# Patient Record
Sex: MALE | Race: WHITE | NOT HISPANIC OR LATINO | Employment: OTHER | URBAN - METROPOLITAN AREA
[De-identification: names, ages, dates, MRNs, and addresses within clinical notes are randomized per-mention and may not be internally consistent; named-entity substitution may affect disease eponyms.]

---

## 2020-01-13 ENCOUNTER — OFFICE VISIT (OUTPATIENT)
Dept: PULMONOLOGY | Facility: CLINIC | Age: 60
End: 2020-01-13

## 2020-01-13 VITALS
SYSTOLIC BLOOD PRESSURE: 142 MMHG | OXYGEN SATURATION: 96 % | TEMPERATURE: 97.7 F | WEIGHT: 244 LBS | BODY MASS INDEX: 36.14 KG/M2 | HEART RATE: 69 BPM | HEIGHT: 69 IN | DIASTOLIC BLOOD PRESSURE: 70 MMHG

## 2020-01-13 DIAGNOSIS — Z72.0 TOBACCO ABUSE: ICD-10-CM

## 2020-01-13 DIAGNOSIS — J44.9 CHRONIC OBSTRUCTIVE PULMONARY DISEASE, UNSPECIFIED COPD TYPE (HCC): ICD-10-CM

## 2020-01-13 DIAGNOSIS — J44.9 CHRONIC OBSTRUCTIVE PULMONARY DISEASE, UNSPECIFIED COPD TYPE (HCC): Primary | ICD-10-CM

## 2020-01-13 PROCEDURE — 94729 DIFFUSING CAPACITY: CPT | Performed by: INTERNAL MEDICINE

## 2020-01-13 PROCEDURE — 94726 PLETHYSMOGRAPHY LUNG VOLUMES: CPT | Performed by: INTERNAL MEDICINE

## 2020-01-13 PROCEDURE — 99204 OFFICE O/P NEW MOD 45 MIN: CPT | Performed by: INTERNAL MEDICINE

## 2020-01-13 PROCEDURE — 99407 BEHAV CHNG SMOKING > 10 MIN: CPT | Performed by: INTERNAL MEDICINE

## 2020-01-13 PROCEDURE — 94375 RESPIRATORY FLOW VOLUME LOOP: CPT | Performed by: INTERNAL MEDICINE

## 2020-01-13 RX ORDER — POTASSIUM CHLORIDE 20 MEQ/1
20 TABLET, EXTENDED RELEASE ORAL DAILY
COMMUNITY
Start: 2019-12-27

## 2020-01-13 RX ORDER — ALBUTEROL SULFATE 90 UG/1
2 AEROSOL, METERED RESPIRATORY (INHALATION) EVERY 4 HOURS PRN
Qty: 6.7 G | Refills: 11 | Status: SHIPPED | OUTPATIENT
Start: 2020-01-13

## 2020-01-13 RX ORDER — SIMVASTATIN 10 MG
10 TABLET ORAL NIGHTLY
COMMUNITY
End: 2020-01-14 | Stop reason: ALTCHOICE

## 2020-01-13 RX ORDER — TAMSULOSIN HYDROCHLORIDE 0.4 MG/1
2 CAPSULE ORAL NIGHTLY
COMMUNITY
Start: 2019-12-27

## 2020-01-13 RX ORDER — CHOLECALCIFEROL (VITAMIN D3) 25 MCG
CAPSULE ORAL
COMMUNITY
Start: 2019-10-31 | End: 2020-01-14 | Stop reason: SDUPTHER

## 2020-01-13 RX ORDER — CHLORTHALIDONE 25 MG/1
25 TABLET ORAL DAILY
COMMUNITY
Start: 2019-12-27

## 2020-01-13 RX ORDER — CHOLECALCIFEROL (VITAMIN D3) 50 MCG
2000 TABLET ORAL DAILY
COMMUNITY
Start: 2019-12-27

## 2020-01-13 RX ORDER — ASPIRIN 81 MG/1
81 TABLET, COATED ORAL DAILY
COMMUNITY
Start: 2019-12-27

## 2020-01-13 NOTE — PROGRESS NOTES
New Patient Pulmonary Office Visit      Patient Name: Stefano Claudio    Referring Physician: Jessie Rosas*    Chief Complaint:    Chief Complaint   Patient presents with   • COPD       History of Present Illness: Stefano Claudio is a 59 y.o. male who is here today to establish care with Pulmonary.  Patient has a past medical history for COPD and tobacco abuse.  Who presents to [facility] pulmonary for evaluation of his COPD.    COPD: He presents for evaluation and treatment of COPD Gold stage 3 class C. Symptoms include cough with mucus production, and dyspnea with exertion. Symptoms began 3 months ago. Symptoms are exacerbated by exertion.  Symptoms are alleviated by rest. Patient's last exacerbation was 2 weeks ago.  The patient reports that he is never been on any inhalers.  He notes that he smokes a pack per day for the last 45 years.  He has tried quitting in the past unfortunately he has not been successful, is not sure he is ready to quit at this point.    Review of Systems:   Review of Systems   Constitutional: Negative for activity change, appetite change, chills and diaphoresis.   HENT: Negative for congestion, postnasal drip, sinus pressure and voice change.    Eyes: Negative for blurred vision.   Respiratory: Positive for cough and shortness of breath. Negative for wheezing.    Cardiovascular: Negative for chest pain.   Gastrointestinal: Negative for abdominal pain.   Musculoskeletal: Negative for myalgias.   Skin: Negative for color change and dry skin.   Allergic/Immunologic: Negative for environmental allergies.   Neurological: Negative for weakness and confusion.   Hematological: Negative for adenopathy.   Psychiatric/Behavioral: Negative for sleep disturbance and depressed mood.       Past Medical History:   Past Medical History:   Diagnosis Date   • CHF (congestive heart failure) (CMS/[facility])    • Sleep apnea        Past Surgical History:   Past Surgical History:  "  Procedure Laterality Date   • COLON SURGERY         Family History: History reviewed. No pertinent family history.    Social History:   Social History     Socioeconomic History   • Marital status:      Spouse name: Not on file   • Number of children: Not on file   • Years of education: Not on file   • Highest education level: Not on file   Tobacco Use   • Smoking status: Current Every Day Smoker     Packs/day: 1.00     Years: 43.00     Pack years: 43.00     Types: Cigarettes   Substance and Sexual Activity   • Alcohol use: Defer   • Drug use: Defer   • Sexual activity: Defer       Medications:     Current Outpatient Medications:   •  ASPIRIN LOW DOSE 81 MG EC tablet, , Disp: , Rfl:   •  chlorthalidone (HYGROTON) 25 MG tablet, , Disp: , Rfl:   •  Cholecalciferol (VITAMIN D) 50 MCG (2000 UT) tablet, , Disp: , Rfl:   •  Cholecalciferol (VITAMIN D-3) 25 MCG (1000 UT) capsule, , Disp: , Rfl:   •  potassium chloride (K-DUR,KLOR-CON) 20 MEQ CR tablet, , Disp: , Rfl:   •  simvastatin (ZOCOR) 10 MG tablet, Take 10 mg by mouth Every Night., Disp: , Rfl:   •  tamsulosin (FLOMAX) 0.4 MG capsule 24 hr capsule, , Disp: , Rfl:   •  albuterol sulfate  (90 Base) MCG/ACT inhaler, Inhale 2 puffs Every 4 (Four) Hours As Needed for Wheezing., Disp: 6.7 g, Rfl: 11  •  tiotropium bromide monohydrate (SPIRIVA RESPIMAT) 2.5 MCG/ACT aerosol solution inhaler, Inhale 2 puffs Daily., Disp: 2 inhaler, Rfl: 0    Allergies:   Allergies   Allergen Reactions   • Penicillins Hives   • Adhesive Tape Rash       Physical Exam:  Vital Signs:   Vitals:    01/13/20 1335   BP: 142/70   BP Location: Left arm   Patient Position: Sitting   Pulse: 69   Temp: 97.7 °F (36.5 °C)   SpO2: 96%  Comment: resting at room air   Weight: 111 kg (244 lb)   Height: 175.3 cm (69\")       Physical Exam   Constitutional: He is oriented to person, place, and time. He appears well-developed.   HENT:   Head: Normocephalic and atraumatic.   Nose: Nose normal. "   Mouth/Throat: Oropharynx is clear and moist.   Eyes: Pupils are equal, round, and reactive to light. Conjunctivae are normal.   Neck: Normal range of motion. Neck supple.   Cardiovascular: Normal rate and regular rhythm. Exam reveals no gallop and no friction rub.   No murmur heard.  Pulmonary/Chest: Effort normal and breath sounds normal. He has no wheezes. He has no rales.   Abdominal: Soft. Bowel sounds are normal.   Musculoskeletal: Normal range of motion. He exhibits no edema.   Neurological: He is alert and oriented to person, place, and time.   Skin: Skin is warm and dry. No erythema.   Psychiatric: He has a normal mood and affect. His behavior is normal.   Nursing note and vitals reviewed.      Results Review:   - I personally reviewed the pts imaging from 1/30/2020 chest x-ray shows no acute cardiopulmonary process  - I personally reviewed the pts PFT from 1/13/2020 showed moderate obstruction with no restriction and a moderately Doose DLCO    Assessment / Plan:   Chronic obstructive pulmonary disease, unspecified COPD type (CMS/HCC)  -Patient has obstructive PFTs and a long smoking history with a chronic cough this is consistent with COPD.  I explained to him that the goal of COPD would ultimately him to quit smoking and to prevent exacerbations.  I recommend this point that he start on inhaler I have prescribed him Spiriva Respimat 2 puffs once daily.  I explained to him that I have sent a prescription to the pharmacy, but if it is too expensive to please let us know and we would be happy to switch him to any other LAMA that his insurance would approve.  I also wrote him a prescription for an albuterol inhaler to use as needed.    Tobacco abuse  - In this visit the patient was advised to stop smoking and was offered tobacco cessation measures and resources, including NRT and/or medication intervention. Greater than 10 minutes was spent on face-to-face counseling regarding smoking cessation.   -Patient  informed that he is undergoing a CT scan of the chest tomorrow to look for lung cancer screening, I will plan to review this once it is performed.      Follow Up:   Return in about 3 months (around 4/13/2020).     MICHELLE Whyte,   Pulmonary and Critical Care Medicine  Note Electronically Signed    Please note that portions of this note may have been completed with a voice recognition program. Efforts were made to edit the dictations, but occasionally words are mistranscribed.

## 2020-01-13 NOTE — PROGRESS NOTES
"[facility]  [facility]      01/14/2020       Setfano Claudio  [address] [place] [address]  [unfilled]    1960    Iam Christopher APRN    Stefano Claudio is a 59 y.o. male.      Subjective:     Chief Complaint:  Congestive Heart Failure and Establish Care       HPI   59-year-old male with past medical history significant for PAD, hypertension, COPD with ongoing tobacco abuse, hyperlipidemia, DM (he reports borderline but recent a1c 6.9) ,sleep apnea and CHF who presents to the [facility] to establish care at the request of ISAI Trinh. Patient reports intermittent chest pain over the past couple of months. Describes as a pressure, usually induced by stress, no recent episodes over the past few weeks. No exertionally induced. He reports history of CHF and valve that would \"stay open\". He thinks he is on lasix but its not on his list today. He reports history of lymphedema and venous ablation by Dr. Bernstein. He denies history of PAD, though listed on his history. He smokes 1ppd for 43 years    Cardiac risks: HTN, DM, HLD, Tobacco abuse, gender, obesity, sedentary lifestyle, age    Patient Active Problem List   Diagnosis   • Tobacco abuse   • Chronic obstructive pulmonary disease (CMS/HCC)   • CHF (congestive heart failure) (CMS/HCC)   • Diabetes mellitus, type II (CMS/HCC)   • HTN (hypertension)   • Sleep apnea   • PAD (peripheral artery disease) (CMS/HCC)   • COPD (chronic obstructive pulmonary disease) (CMS/HCC)       Past Medical History:   Diagnosis Date   • CHF (congestive heart failure) (CMS/HCC)    • COPD (chronic obstructive pulmonary disease) (CMS/HCC)    • Diabetes mellitus, type II (CMS/HCC)    • HTN (hypertension)    • PAD (peripheral artery disease) (CMS/HCC)    • Sleep apnea        Past Surgical History:   Procedure Laterality Date   • COLON SURGERY         Family History   Adopted: Yes       Social History "     Socioeconomic History   • Marital status:      Spouse name: Not on file   • Number of children: Not on file   • Years of education: Not on file   • Highest education level: Not on file   Tobacco Use   • Smoking status: Current Every Day Smoker     Packs/day: 1.00     Years: 43.00     Pack years: 43.00     Types: Cigarettes   • Smokeless tobacco: Never Used   Substance and Sexual Activity   • Alcohol use: Defer   • Drug use: Defer   • Sexual activity: Defer   Social History Narrative    Caffeine coffee 1 serving of coffee 1 two liter of soda daily       Allergies   Allergen Reactions   • Penicillins Hives   • Adhesive Tape Rash         Current Outpatient Medications:   •  acetaminophen (TYLENOL) 500 MG tablet, Take 1,000 mg by mouth 2 (Two) Times a Day As Needed for Mild Pain  (leg pain)., Disp: , Rfl:   •  atorvastatin (LIPITOR) 40 MG tablet, Take 40 mg by mouth Daily., Disp: , Rfl:   •  loratadine (CLARITIN) 10 MG tablet, Take 10 mg by mouth Daily., Disp: , Rfl:   •  naproxen (NAPROSYN) 500 MG tablet, Take 500 mg by mouth 2 (Two) Times a Day As Needed for Mild Pain  (leg pain)., Disp: , Rfl:   •  albuterol sulfate  (90 Base) MCG/ACT inhaler, Inhale 2 puffs Every 4 (Four) Hours As Needed for Wheezing., Disp: 6.7 g, Rfl: 11  •  ASPIRIN LOW DOSE 81 MG EC tablet, Take 81 mg by mouth Daily., Disp: , Rfl:   •  chlorthalidone (HYGROTON) 25 MG tablet, Take 25 mg by mouth Daily., Disp: , Rfl:   •  Cholecalciferol (VITAMIN D) 50 MCG (2000 UT) tablet, Take 2,000 Units by mouth Daily., Disp: , Rfl:   •  potassium chloride (K-DUR,KLOR-CON) 20 MEQ CR tablet, Take 20 mEq by mouth Daily., Disp: , Rfl:   •  tamsulosin (FLOMAX) 0.4 MG capsule 24 hr capsule, Take 2 capsules by mouth Every Night., Disp: , Rfl:   •  Umeclidinium Bromide (INCRUSE ELLIPTA) 62.5 MCG/INH aerosol powder , Inhale 1 puff Daily., Disp: 1 each, Rfl: 3    The following portions of the patient's history were reviewed today and updated as  "appropriate: allergies, current medications, past family history, past medical history, past social history, past surgical history and problem list     Review of Systems   Constitution: Positive for malaise/fatigue. Negative for chills and fever.   HENT: Negative.    Eyes: Negative.    Cardiovascular: Positive for dyspnea on exertion. Negative for chest pain, claudication, cyanosis, irregular heartbeat, leg swelling, near-syncope, orthopnea, palpitations, paroxysmal nocturnal dyspnea and syncope.   Respiratory: Positive for cough, shortness of breath, snoring and sputum production.    Endocrine: Negative.    Hematologic/Lymphatic: Does not bruise/bleed easily.   Skin: Negative for poor wound healing.   Musculoskeletal: Negative.    Gastrointestinal: Positive for diarrhea. Negative for abdominal pain, heartburn, hematemesis, melena, nausea and vomiting.   Genitourinary: Negative.  Negative for hematuria.   Neurological: Negative.    Psychiatric/Behavioral: The patient is nervous/anxious.    Allergic/Immunologic: Negative.        Objective:     Vitals:    01/14/20 1501 01/14/20 1511 01/14/20 1512   BP: 123/61 120/65 115/65   BP Location: Left arm Right arm Right arm   Patient Position: Sitting Sitting Standing   Cuff Size: Adult Adult Adult   Pulse: 67  70   Resp: 17     Temp: 98.1 °F (36.7 °C)     TempSrc: Temporal     SpO2: 97%  96%   Weight: 111 kg (244 lb 3 oz)     Height: 175.3 cm (69\")         Body mass index is 36.06 kg/m².    Physical Exam   Constitutional: He is oriented to person, place, and time. He appears well-developed and well-nourished. No distress.   HENT:   Head: Normocephalic.   Eyes: Pupils are equal, round, and reactive to light. Conjunctivae are normal.   Neck: Neck supple. No JVD present. No thyromegaly present.   Cardiovascular: Normal rate, regular rhythm and intact distal pulses. Exam reveals no gallop and no friction rub.   Murmur (JE) heard.  Pulmonary/Chest: Effort normal and breath " sounds normal. No respiratory distress. He has no wheezes. He has no rales. He exhibits no tenderness.   Abdominal: Soft. Bowel sounds are normal.   Musculoskeletal: Normal range of motion. He exhibits edema (1+ BLE).   Neurological: He is alert and oriented to person, place, and time.   Skin: Skin is warm and dry.   Chronic venous insufficiency   Psychiatric: He has a normal mood and affect. His behavior is normal. Thought content normal.   Vitals reviewed.      Lab and Diagnostic Review:  Labs 12/20/2019 show WBC 10.3, hemoglobin 16.1, hematocrit 40.7, platelet 186.  Glucose 100, sodium 138, potassium 4.2, chloride 95,, dioxide 26, BUN 15, creatinine 0.8, AST 17, ALT 18.  Total cholesterol 140, HDL 29, LDL 74, triglycerides 184.  Hemoglobin A1c 6.9, TSH 2.26, vitamin B12 564, vitamin D 28  EKG: Sinus bradycardia    Assessment and Plan:   1. Chest pain, unspecified type  No recent symptoms but multiple ASCVD risks  - Stress Test With Myocardial Perfusion (2 Day); Future    2. MORSE (dyspnea on exertion)  - Stress Test With Myocardial Perfusion (2 Day); Future  - Adult Transthoracic Echo Complete W/ Cont if Necessary Per Protocol; Future    3. Chronic congestive heart failure, unspecified heart failure type (CMS/HCC)  Data deficit, report valve disease. Will try and get previous records from [facility]  - Stress Test With Myocardial Perfusion (2 Day); Future  - Adult Transthoracic Echo Complete W/ Cont if Necessary Per Protocol; Future    4. Essential hypertension  Controlled  - Stress Test With Myocardial Perfusion (2 Day); Future  - Adult Transthoracic Echo Complete W/ Cont if Necessary Per Protocol; Future    5. Chronic venous insufficiency  Reported ablation by , will try and get records    Further plans pending testing        It has been a pleasure to participate in the care of this patient.  Patient was instructed to call the [facility] with any questions, concerns, or worsening symptoms.    *Please  note that portions of this note were completed with a voice recognition program. Efforts were made to edit the dictations, but occasionally words are mistranscribed.

## 2020-01-14 ENCOUNTER — TELEPHONE (OUTPATIENT)
Dept: PULMONOLOGY | Facility: CLINIC | Age: 60
End: 2020-01-14

## 2020-01-14 ENCOUNTER — OFFICE VISIT (OUTPATIENT)
Dept: CARDIOLOGY | Facility: HOSPITAL | Age: 60
End: 2020-01-14

## 2020-01-14 VITALS
RESPIRATION RATE: 17 BRPM | HEART RATE: 70 BPM | OXYGEN SATURATION: 96 % | BODY MASS INDEX: 36.17 KG/M2 | HEIGHT: 69 IN | SYSTOLIC BLOOD PRESSURE: 115 MMHG | WEIGHT: 244.19 LBS | TEMPERATURE: 98.1 F | DIASTOLIC BLOOD PRESSURE: 65 MMHG

## 2020-01-14 DIAGNOSIS — R07.9 CHEST PAIN, UNSPECIFIED TYPE: Primary | ICD-10-CM

## 2020-01-14 DIAGNOSIS — I87.2 CHRONIC VENOUS INSUFFICIENCY: ICD-10-CM

## 2020-01-14 DIAGNOSIS — I50.9 CHRONIC CONGESTIVE HEART FAILURE, UNSPECIFIED HEART FAILURE TYPE (HCC): ICD-10-CM

## 2020-01-14 DIAGNOSIS — R06.09 DOE (DYSPNEA ON EXERTION): ICD-10-CM

## 2020-01-14 DIAGNOSIS — J44.9 CHRONIC OBSTRUCTIVE PULMONARY DISEASE, UNSPECIFIED COPD TYPE (HCC): Primary | ICD-10-CM

## 2020-01-14 DIAGNOSIS — I10 ESSENTIAL HYPERTENSION: ICD-10-CM

## 2020-01-14 PROCEDURE — 99204 OFFICE O/P NEW MOD 45 MIN: CPT | Performed by: NURSE PRACTITIONER

## 2020-01-14 RX ORDER — NAPROXEN 500 MG/1
500 TABLET ORAL 2 TIMES DAILY PRN
COMMUNITY

## 2020-01-14 RX ORDER — ATORVASTATIN CALCIUM 40 MG/1
40 TABLET, FILM COATED ORAL DAILY
COMMUNITY

## 2020-01-14 RX ORDER — ACETAMINOPHEN 500 MG
1000 TABLET ORAL 2 TIMES DAILY PRN
COMMUNITY

## 2020-01-14 RX ORDER — LORATADINE 10 MG/1
10 TABLET ORAL DAILY
COMMUNITY

## 2020-02-02 ENCOUNTER — HOSPITAL ENCOUNTER (OUTPATIENT)
Dept: CARDIOLOGY | Facility: HOSPITAL | Age: 60
Discharge: HOME | End: 2020-02-02
Admitting: NURSE PRACTITIONER

## 2020-02-02 VITALS — WEIGHT: 244 LBS | HEIGHT: 69 IN | BODY MASS INDEX: 36.14 KG/M2

## 2020-02-02 DIAGNOSIS — I50.9 CHRONIC CONGESTIVE HEART FAILURE, UNSPECIFIED HEART FAILURE TYPE (HCC): ICD-10-CM

## 2020-02-02 DIAGNOSIS — I10 ESSENTIAL HYPERTENSION: ICD-10-CM

## 2020-02-02 DIAGNOSIS — R06.09 DOE (DYSPNEA ON EXERTION): ICD-10-CM

## 2020-02-02 LAB
ASCENDING AORTA: 3 CM
BH CV ECHO MEAS - AO MAX PG (FULL): 4.9 MMHG
BH CV ECHO MEAS - AO MAX PG: 11.1 MMHG
BH CV ECHO MEAS - AO MEAN PG (FULL): 2.3 MMHG
BH CV ECHO MEAS - AO MEAN PG: 5.3 MMHG
BH CV ECHO MEAS - AO ROOT AREA (BSA CORRECTED): 1.4
BH CV ECHO MEAS - AO ROOT AREA: 7.6 CM^2
BH CV ECHO MEAS - AO ROOT DIAM: 3.1 CM
BH CV ECHO MEAS - AO V2 MAX: 166.7 CM/SEC
BH CV ECHO MEAS - AO V2 MEAN: 104.8 CM/SEC
BH CV ECHO MEAS - AO V2 VTI: 35.1 CM
BH CV ECHO MEAS - ASC AORTA: 3 CM
BH CV ECHO MEAS - AVA(I,A): 2.3 CM^2
BH CV ECHO MEAS - AVA(I,D): 2.3 CM^2
BH CV ECHO MEAS - AVA(V,A): 2.4 CM^2
BH CV ECHO MEAS - AVA(V,D): 2.4 CM^2
BH CV ECHO MEAS - BSA(HAYCOCK): 2.4 M^2
BH CV ECHO MEAS - BSA: 2.2 M^2
BH CV ECHO MEAS - BZI_BMI: 36 KILOGRAMS/M^2
BH CV ECHO MEAS - BZI_METRIC_HEIGHT: 175.3 CM
BH CV ECHO MEAS - BZI_METRIC_WEIGHT: 110.7 KG
BH CV ECHO MEAS - EDV(CUBED): 82.3 ML
BH CV ECHO MEAS - EDV(MOD-SP2): 79 ML
BH CV ECHO MEAS - EDV(MOD-SP4): 91 ML
BH CV ECHO MEAS - EDV(TEICH): 85.3 ML
BH CV ECHO MEAS - EF(CUBED): 68.1 %
BH CV ECHO MEAS - EF(MOD-BP): 65 %
BH CV ECHO MEAS - EF(MOD-SP2): 67.1 %
BH CV ECHO MEAS - EF(MOD-SP4): 61.5 %
BH CV ECHO MEAS - EF(TEICH): 59.9 %
BH CV ECHO MEAS - ESV(CUBED): 26.2 ML
BH CV ECHO MEAS - ESV(MOD-SP2): 26 ML
BH CV ECHO MEAS - ESV(MOD-SP4): 35 ML
BH CV ECHO MEAS - ESV(TEICH): 34.2 ML
BH CV ECHO MEAS - FS: 31.7 %
BH CV ECHO MEAS - IVS/LVPW: 0.89
BH CV ECHO MEAS - IVSD: 1.1 CM
BH CV ECHO MEAS - LA DIMENSION: 3.1 CM
BH CV ECHO MEAS - LA/AO: 0.99
BH CV ECHO MEAS - LAD MAJOR: 5.4 CM
BH CV ECHO MEAS - LAT PEAK E' VEL: 15 CM/SEC
BH CV ECHO MEAS - LATERAL E/E' RATIO: 6.3
BH CV ECHO MEAS - LV DIASTOLIC VOL/BSA (35-75): 40.5 ML/M^2
BH CV ECHO MEAS - LV MASS(C)D: 145.2 GRAMS
BH CV ECHO MEAS - LV MASS(C)DI: 64.6 GRAMS/M^2
BH CV ECHO MEAS - LV MAX PG: 6.2 MMHG
BH CV ECHO MEAS - LV MEAN PG: 3 MMHG
BH CV ECHO MEAS - LV SYSTOLIC VOL/BSA (12-30): 15.6 ML/M^2
BH CV ECHO MEAS - LV V1 MAX: 124.4 CM/SEC
BH CV ECHO MEAS - LV V1 MEAN: 77.9 CM/SEC
BH CV ECHO MEAS - LV V1 VTI: 25 CM
BH CV ECHO MEAS - LVIDD: 4.5 CM
BH CV ECHO MEAS - LVIDS: 3 CM
BH CV ECHO MEAS - LVLD AP2: 7.6 CM
BH CV ECHO MEAS - LVLD AP4: 8.2 CM
BH CV ECHO MEAS - LVLS AP2: 5.9 CM
BH CV ECHO MEAS - LVLS AP4: 6.5 CM
BH CV ECHO MEAS - LVOT AREA (M): 3.1 CM^2
BH CV ECHO MEAS - LVOT AREA: 3.2 CM^2
BH CV ECHO MEAS - LVOT DIAM: 2 CM
BH CV ECHO MEAS - LVPWD: 1.1 CM
BH CV ECHO MEAS - MED PEAK E' VEL: 10.3 CM/SEC
BH CV ECHO MEAS - MEDIAL E/E' RATIO: 9.2
BH CV ECHO MEAS - MV A MAX VEL: 67.1 CM/SEC
BH CV ECHO MEAS - MV DEC TIME: 0.26 SEC
BH CV ECHO MEAS - MV E MAX VEL: 95.8 CM/SEC
BH CV ECHO MEAS - MV E/A: 1.4
BH CV ECHO MEAS - MV MAX PG: 4 MMHG
BH CV ECHO MEAS - MV MEAN PG: 1.8 MMHG
BH CV ECHO MEAS - MV V2 MAX: 100.4 CM/SEC
BH CV ECHO MEAS - MV V2 MEAN: 64 CM/SEC
BH CV ECHO MEAS - MV V2 VTI: 35.1 CM
BH CV ECHO MEAS - MVA(VTI): 2.3 CM^2
BH CV ECHO MEAS - PA ACC SLOPE: 629.4 CM/SEC^2
BH CV ECHO MEAS - PA ACC TIME: 0.13 SEC
BH CV ECHO MEAS - PA MAX PG: 4.6 MMHG
BH CV ECHO MEAS - PA PR(ACCEL): 20.8 MMHG
BH CV ECHO MEAS - PA V2 MAX: 107.6 CM/SEC
BH CV ECHO MEAS - RAP SYSTOLE: 3 MMHG
BH CV ECHO MEAS - SI(AO): 117.8 ML/M^2
BH CV ECHO MEAS - SI(CUBED): 24.9 ML/M^2
BH CV ECHO MEAS - SI(LVOT): 35.9 ML/M^2
BH CV ECHO MEAS - SI(MOD-SP2): 23.6 ML/M^2
BH CV ECHO MEAS - SI(MOD-SP4): 24.9 ML/M^2
BH CV ECHO MEAS - SI(TEICH): 22.8 ML/M^2
BH CV ECHO MEAS - SV(AO): 264.8 ML
BH CV ECHO MEAS - SV(CUBED): 56.1 ML
BH CV ECHO MEAS - SV(LVOT): 80.6 ML
BH CV ECHO MEAS - SV(MOD-SP2): 53 ML
BH CV ECHO MEAS - SV(MOD-SP4): 56 ML
BH CV ECHO MEAS - SV(TEICH): 51.2 ML
BH CV ECHO MEAS - TAPSE (>1.6): 2.3 CM2
BH CV ECHO MEASUREMENTS AVERAGE E/E' RATIO: 7.57
BH CV VAS BP LEFT ARM: NORMAL MMHG
BH CV XLRA - RV BASE: 4 CM
BH CV XLRA - RV LENGTH: 8 CM
BH CV XLRA - RV MID: 2.9 CM
BH CV XLRA - TDI S': 15.2 CM/SEC
LEFT ATRIUM VOLUME INDEX: 17.8 ML/M^2
LEFT ATRIUM VOLUME: 40 ML
LV EF 2D ECHO EST: 65 %

## 2020-02-02 PROCEDURE — 93306 TTE W/DOPPLER COMPLETE: CPT | Performed by: INTERNAL MEDICINE

## 2020-02-02 PROCEDURE — 93306 TTE W/DOPPLER COMPLETE: CPT

## 2020-02-03 ENCOUNTER — TELEPHONE (OUTPATIENT)
Dept: CARDIOLOGY | Facility: HOSPITAL | Age: 60
End: 2020-02-03

## 2020-02-03 NOTE — TELEPHONE ENCOUNTER
----- Message from ISAI Beltre sent at 2/3/2020  7:23 AM EST -----  Lamont Harrison,    Would you please let patient know that his echo was within normal limits and that I will call him next week when I get his stress test back?    Thank you!  Lamar

## 2020-02-06 ENCOUNTER — HOSPITAL ENCOUNTER (EMERGENCY)
Facility: HOSPITAL | Age: 60
Discharge: HOME | End: 2020-02-07
Attending: EMERGENCY MEDICINE | Admitting: EMERGENCY MEDICINE

## 2020-02-06 DIAGNOSIS — R55 NEAR SYNCOPE: Primary | ICD-10-CM

## 2020-02-06 DIAGNOSIS — E86.0 MILD DEHYDRATION: ICD-10-CM

## 2020-02-06 DIAGNOSIS — R51.9 ACUTE NONINTRACTABLE HEADACHE, UNSPECIFIED HEADACHE TYPE: ICD-10-CM

## 2020-02-06 LAB
ALBUMIN SERPL-MCNC: 4 G/DL (ref 3.5–5.2)
ALBUMIN/GLOB SERPL: 1.4 G/DL
ALP SERPL-CCNC: 82 U/L (ref 39–117)
ALT SERPL W P-5'-P-CCNC: 16 U/L (ref 1–41)
ANION GAP SERPL CALCULATED.3IONS-SCNC: 11 MMOL/L (ref 5–15)
AST SERPL-CCNC: 18 U/L (ref 1–40)
BASOPHILS # BLD AUTO: 0.04 10*3/MM3 (ref 0–0.2)
BASOPHILS NFR BLD AUTO: 0.5 % (ref 0–1.5)
BILIRUB SERPL-MCNC: 0.8 MG/DL (ref 0.2–1.2)
BUN BLD-MCNC: 14 MG/DL (ref 6–20)
BUN/CREAT SERPL: 13.2 (ref 7–25)
CALCIUM SPEC-SCNC: 9 MG/DL (ref 8.6–10.5)
CHLORIDE SERPL-SCNC: 100 MMOL/L (ref 98–107)
CO2 SERPL-SCNC: 29 MMOL/L (ref 22–29)
CREAT BLD-MCNC: 1.06 MG/DL (ref 0.76–1.27)
DEPRECATED RDW RBC AUTO: 46.2 FL (ref 37–54)
EOSINOPHIL # BLD AUTO: 0.2 10*3/MM3 (ref 0–0.4)
EOSINOPHIL NFR BLD AUTO: 2.5 % (ref 0.3–6.2)
ERYTHROCYTE [DISTWIDTH] IN BLOOD BY AUTOMATED COUNT: 13.5 % (ref 12.3–15.4)
GFR SERPL CREATININE-BSD FRML MDRD: 72 ML/MIN/1.73
GLOBULIN UR ELPH-MCNC: 2.8 GM/DL
GLUCOSE BLD-MCNC: 113 MG/DL (ref 65–99)
GLUCOSE BLDC GLUCOMTR-MCNC: 131 MG/DL (ref 70–130)
HBA1C MFR BLD: 6.2 % (ref 4.8–5.6)
HCT VFR BLD AUTO: 43.7 % (ref 37.5–51)
HGB BLD-MCNC: 14.7 G/DL (ref 13–17.7)
HOLD SPECIMEN: NORMAL
HOLD SPECIMEN: NORMAL
IMM GRANULOCYTES # BLD AUTO: 0.03 10*3/MM3 (ref 0–0.05)
IMM GRANULOCYTES NFR BLD AUTO: 0.4 % (ref 0–0.5)
LYMPHOCYTES # BLD AUTO: 2.11 10*3/MM3 (ref 0.7–3.1)
LYMPHOCYTES NFR BLD AUTO: 25.9 % (ref 19.6–45.3)
MAGNESIUM SERPL-MCNC: 2.1 MG/DL (ref 1.6–2.6)
MCH RBC QN AUTO: 31.1 PG (ref 26.6–33)
MCHC RBC AUTO-ENTMCNC: 33.6 G/DL (ref 31.5–35.7)
MCV RBC AUTO: 92.4 FL (ref 79–97)
MONOCYTES # BLD AUTO: 0.7 10*3/MM3 (ref 0.1–0.9)
MONOCYTES NFR BLD AUTO: 8.6 % (ref 5–12)
NEUTROPHILS # BLD AUTO: 5.07 10*3/MM3 (ref 1.7–7)
NEUTROPHILS NFR BLD AUTO: 62.1 % (ref 42.7–76)
NRBC BLD AUTO-RTO: 0 /100 WBC (ref 0–0.2)
PLATELET # BLD AUTO: 196 10*3/MM3 (ref 140–450)
PMV BLD AUTO: 10.4 FL (ref 6–12)
POTASSIUM BLD-SCNC: 3.1 MMOL/L (ref 3.5–5.2)
PROT SERPL-MCNC: 6.8 G/DL (ref 6–8.5)
RBC # BLD AUTO: 4.73 10*6/MM3 (ref 4.14–5.8)
SODIUM BLD-SCNC: 140 MMOL/L (ref 136–145)
TROPONIN T SERPL-MCNC: <0.01 NG/ML (ref 0–0.03)
WBC NRBC COR # BLD: 8.15 10*3/MM3 (ref 3.4–10.8)
WHOLE BLOOD HOLD SPECIMEN: NORMAL
WHOLE BLOOD HOLD SPECIMEN: NORMAL

## 2020-02-06 PROCEDURE — 84484 ASSAY OF TROPONIN QUANT: CPT | Performed by: EMERGENCY MEDICINE

## 2020-02-06 PROCEDURE — 99284 EMERGENCY DEPT VISIT MOD MDM: CPT

## 2020-02-06 PROCEDURE — 85025 COMPLETE CBC W/AUTO DIFF WBC: CPT | Performed by: EMERGENCY MEDICINE

## 2020-02-06 PROCEDURE — 80053 COMPREHEN METABOLIC PANEL: CPT | Performed by: EMERGENCY MEDICINE

## 2020-02-06 PROCEDURE — 82962 GLUCOSE BLOOD TEST: CPT

## 2020-02-06 PROCEDURE — 83036 HEMOGLOBIN GLYCOSYLATED A1C: CPT | Performed by: EMERGENCY MEDICINE

## 2020-02-06 PROCEDURE — 83735 ASSAY OF MAGNESIUM: CPT | Performed by: EMERGENCY MEDICINE

## 2020-02-06 PROCEDURE — 93005 ELECTROCARDIOGRAM TRACING: CPT | Performed by: EMERGENCY MEDICINE

## 2020-02-06 RX ORDER — SODIUM CHLORIDE 0.9 % (FLUSH) 0.9 %
10 SYRINGE (ML) INJECTION AS NEEDED
Status: DISCONTINUED | OUTPATIENT
Start: 2020-02-06 | End: 2020-02-07 | Stop reason: HOSPADM

## 2020-02-06 RX ORDER — ACETAMINOPHEN 500 MG
1000 TABLET ORAL ONCE
Status: COMPLETED | OUTPATIENT
Start: 2020-02-06 | End: 2020-02-06

## 2020-02-06 RX ADMIN — ACETAMINOPHEN 1000 MG: 500 TABLET, FILM COATED ORAL at 23:21

## 2020-02-07 VITALS
BODY MASS INDEX: 38.57 KG/M2 | RESPIRATION RATE: 18 BRPM | OXYGEN SATURATION: 98 % | SYSTOLIC BLOOD PRESSURE: 110 MMHG | WEIGHT: 240 LBS | HEIGHT: 66 IN | HEART RATE: 65 BPM | DIASTOLIC BLOOD PRESSURE: 65 MMHG | TEMPERATURE: 98.6 F

## 2020-02-07 NOTE — ED PROVIDER NOTES
Subjective   Stefano Claudio is a 59 year old male complaining of dizziness. The patient reports that he developed hiccups earlier this afternoon and was told by his sister to told his breath. The patient states that he held his breath for at least 15 seconds and that when he finally exhaled, he became dizzy, light-headed, and near-syncopal as he stood up quickly. This prompted him to come to the ER today for evaluation. He notes that along with his other symptoms, he is also having a mild, dull headache which began in the back of his head and is now present only on the left side. He denies a history of migraines. He states that he was recently diagnosed with type II diabetes mellitus and takes metformin. Additionally, he has lost 13 pounds since his diagnosis due to adjustments in his lifestyle. Additionally, the patient has a medical history of COPD, congestive heart failure, sleep apnea, hypertension, lymphoma in his bilateral legs, and peripheral artery disease. There are no other acute complaints at this time.      History provided by:  Patient  Dizziness   Quality:  Lightheadedness  Severity:  Moderate  Onset quality:  Sudden  Timing:  Rare  Progression:  Resolved  Chronicity:  New  Context: not with loss of consciousness    Relieved by:  None tried  Ineffective treatments:  None tried  Associated symptoms: headaches        Review of Systems   Constitutional: Negative for unexpected weight change (patient has lost 13 pounds intentionally).   Neurological: Positive for dizziness, light-headedness and headaches. Negative for syncope (near-syncope but no loss of consciousness).   All other systems reviewed and are negative.      Past Medical History:   Diagnosis Date   • CHF (congestive heart failure) (CMS/[facility])    • COPD (chronic obstructive pulmonary disease) (CMS/[facility])    • Diabetes mellitus, type II (CMS/[facility])    • HTN (hypertension)    • PAD (peripheral artery disease) (CMS/[facility])    • Sleep apnea         Allergies   Allergen Reactions   • Penicillins Hives   • Adhesive Tape Rash       Past Surgical History:   Procedure Laterality Date   • COLON SURGERY         Family History   Adopted: Yes       Social History     Socioeconomic History   • Marital status:      Spouse name: Not on file   • Number of children: Not on file   • Years of education: Not on file   • Highest education level: Not on file   Tobacco Use   • Smoking status: Current Every Day Smoker     Packs/day: 1.00     Years: 43.00     Pack years: 43.00     Types: Cigarettes   • Smokeless tobacco: Never Used   Substance and Sexual Activity   • Alcohol use: Defer   • Drug use: Defer   • Sexual activity: Defer   Social History Narrative    Caffeine coffee 1 serving of coffee 1 two liter of soda daily         Objective   Physical Exam   Constitutional: He is oriented to person, place, and time. He appears well-developed and well-nourished. No distress.   Pleasant gentleman who is known to me from prior visits. Tells me jokes while in the room.   HENT:   Head: Normocephalic and atraumatic.   Nose: Nose normal.   Mouth/Throat: Mucous membranes are dry.   Eyes: Pupils are equal, round, and reactive to light. Conjunctivae and EOM are normal. No scleral icterus. Right eye exhibits normal extraocular motion and no nystagmus. Left eye exhibits normal extraocular motion and no nystagmus. Pupils are equal.   PEERL at 3mm. EOMI with no nystagmus.   Neck: Normal range of motion. Neck supple.   Cardiovascular: Normal rate and regular rhythm.   No murmur heard.  Pulmonary/Chest: Effort normal and breath sounds normal. No respiratory distress.   Abdominal: Soft.   Musculoskeletal: Normal range of motion.   Neurological: He is alert and oriented to person, place, and time. No cranial nerve deficit.   Skin: Skin is warm and dry.   Psychiatric: He has a normal mood and affect. His behavior is normal.   Nursing note and vitals reviewed.      Procedures         ED  Course  ED Course as of Feb 06 2351   Thu Feb 06, 2020   2302 Orthostatic vital signs within normal limits.  Patient appears completely nontoxic and very talkative.  We are administering Tylenol along with a large glass of water.  I have encouraged him to stay well-hydrated.    [MS]   2351 Headache has almost completely resolved.    [MS]      ED Course User Index  [MS] Scar Santo MD     Recent Results (from the past 24 hour(s))   Comprehensive Metabolic Panel    Collection Time: 02/06/20 10:26 PM   Result Value Ref Range    Glucose 113 (H) 65 - 99 mg/dL    BUN 14 6 - 20 mg/dL    Creatinine 1.06 0.76 - 1.27 mg/dL    Sodium 140 136 - 145 mmol/L    Potassium 3.1 (L) 3.5 - 5.2 mmol/L    Chloride 100 98 - 107 mmol/L    CO2 29.0 22.0 - 29.0 mmol/L    Calcium 9.0 8.6 - 10.5 mg/dL    Total Protein 6.8 6.0 - 8.5 g/dL    Albumin 4.00 3.50 - 5.20 g/dL    ALT (SGPT) 16 1 - 41 U/L    AST (SGOT) 18 1 - 40 U/L    Alkaline Phosphatase 82 39 - 117 U/L    Total Bilirubin 0.8 0.2 - 1.2 mg/dL    eGFR Non African Amer 72 >60 mL/min/1.73    Globulin 2.8 gm/dL    A/G Ratio 1.4 g/dL    BUN/Creatinine Ratio 13.2 7.0 - 25.0    Anion Gap 11.0 5.0 - 15.0 mmol/L   Magnesium    Collection Time: 02/06/20 10:26 PM   Result Value Ref Range    Magnesium 2.1 1.6 - 2.6 mg/dL   Troponin    Collection Time: 02/06/20 10:26 PM   Result Value Ref Range    Troponin T <0.010 0.000 - 0.030 ng/mL   Light Blue Top    Collection Time: 02/06/20 10:26 PM   Result Value Ref Range    Extra Tube hold for add-on    Green Top (Gel)    Collection Time: 02/06/20 10:26 PM   Result Value Ref Range    Extra Tube Hold for add-ons.    Lavender Top    Collection Time: 02/06/20 10:26 PM   Result Value Ref Range    Extra Tube hold for add-on    Gold Top - SST    Collection Time: 02/06/20 10:26 PM   Result Value Ref Range    Extra Tube Hold for add-ons.    CBC Auto Differential    Collection Time: 02/06/20 10:26 PM   Result Value Ref Range    WBC 8.15 3.40 - 10.80 10*3/mm3  "   RBC 4.73 4.14 - 5.80 10*6/mm3    Hemoglobin 14.7 13.0 - 17.7 g/dL    Hematocrit 43.7 37.5 - 51.0 %    MCV 92.4 79.0 - 97.0 fL    MCH 31.1 26.6 - 33.0 pg    MCHC 33.6 31.5 - 35.7 g/dL    RDW 13.5 12.3 - 15.4 %    RDW-SD 46.2 37.0 - 54.0 fl    MPV 10.4 6.0 - 12.0 fL    Platelets 196 140 - 450 10*3/mm3    Neutrophil % 62.1 42.7 - 76.0 %    Lymphocyte % 25.9 19.6 - 45.3 %    Monocyte % 8.6 5.0 - 12.0 %    Eosinophil % 2.5 0.3 - 6.2 %    Basophil % 0.5 0.0 - 1.5 %    Immature Grans % 0.4 0.0 - 0.5 %    Neutrophils, Absolute 5.07 1.70 - 7.00 10*3/mm3    Lymphocytes, Absolute 2.11 0.70 - 3.10 10*3/mm3    Monocytes, Absolute 0.70 0.10 - 0.90 10*3/mm3    Eosinophils, Absolute 0.20 0.00 - 0.40 10*3/mm3    Basophils, Absolute 0.04 0.00 - 0.20 10*3/mm3    Immature Grans, Absolute 0.03 0.00 - 0.05 10*3/mm3    nRBC 0.0 0.0 - 0.2 /100 WBC   Hemoglobin A1c    Collection Time: 02/06/20 10:26 PM   Result Value Ref Range    Hemoglobin A1C 6.20 (H) 4.80 - 5.60 %   POC Glucose Once    Collection Time: 02/06/20 10:34 PM   Result Value Ref Range    Glucose 131 (H) 70 - 130 mg/dL     Note: In addition to lab results from this visit, the labs listed above may include labs taken at another facility or during a different encounter within the last 24 hours. Please correlate lab times with ED admission and discharge times for further clarification of the services performed during this visit.    No orders to display     Vitals:    02/06/20 2152 02/06/20 2210 02/06/20 2212 02/06/20 2214   BP: 101/73 111/54 115/61 108/57   Patient Position:  Lying Sitting Standing   Pulse: 80 60 67 73   Resp: 16      Temp: 98.6 °F (37 °C)      SpO2: 100%      Weight: 109 kg (240 lb)      Height: 167.6 cm (66\")        Medications   sodium chloride 0.9 % flush 10 mL (has no administration in time range)   acetaminophen (TYLENOL) tablet 1,000 mg (1,000 mg Oral Given 2/6/20 9271)     ECG/EMG Results (last 24 hours)     ** No results found for the last 24 hours. ** "        ECG 12 Lead   Final Result   Test Reason : dizzy   Blood Pressure : **/** mmHG   Vent. Rate : 064 BPM     Atrial Rate : 064 BPM      P-R Int : 150 ms          QRS Dur : 100 ms       QT Int : 442 ms       P-R-T Axes : 049 013 033 degrees      QTc Int : 455 ms      Normal sinus rhythm   Low voltage QRS   Borderline ECG   When compared with ECG of 10-DEC-2011 01:44,   No significant change was found   Confirmed by WILLAM CARMICHAEL MD ([id number]) on 2/6/2020 11:51:07 PM      Referred By:  edmd           Confirmed By:WILLAM CARMICHAEL MD                                                     MDM  Number of Diagnoses or Management Options  Acute nonintractable headache, unspecified headache type:   Mild dehydration:   Near syncope:      Amount and/or Complexity of Data Reviewed  Clinical lab tests: reviewed  Tests in the medicine section of CPT®: reviewed        Final diagnoses:   Near syncope   Mild dehydration   Acute nonintractable headache, unspecified headache type       Documentation assistance provided by lg Velez.  Information recorded by the scribe was done at my direction and has been verified and validated by me.     Ani Velez  02/06/20 3984       Willam Carmichael MD  02/06/20 [id number]

## 2020-02-07 NOTE — DISCHARGE INSTRUCTIONS
Stay well-hydrated.  Drink plenty of water.  Always avoid sugary drinks.      Your hemoglobin A1C = 6.2    If you become lightheaded lie down and raise your legs.    I am really proud of you for losing as much weight as you have.  Please keep trying.    Keep telling jokes, especially the a broken pizza one.    Please review the medications you are supposed to be taking according to prior physician recommendations. I have not changed your home medications during this visit. If your discharge instructions indicate that I have changed your home medications, this is not the case, and you should disregard. If you have any questions about the medication you should be taking at home, please call your physician.

## 2020-02-10 ENCOUNTER — HOSPITAL ENCOUNTER (OUTPATIENT)
Dept: CARDIOLOGY | Facility: HOSPITAL | Age: 60
End: 2020-02-10

## 2020-02-10 ENCOUNTER — APPOINTMENT (OUTPATIENT)
Dept: CARDIOLOGY | Facility: HOSPITAL | Age: 60
End: 2020-02-10

## 2020-02-11 ENCOUNTER — APPOINTMENT (OUTPATIENT)
Dept: CARDIOLOGY | Facility: HOSPITAL | Age: 60
End: 2020-02-11

## 2020-03-16 ENCOUNTER — HOSPITAL ENCOUNTER (EMERGENCY)
Facility: HOSPITAL | Age: 60
Discharge: HOME | End: 2020-03-16
Attending: EMERGENCY MEDICINE | Admitting: EMERGENCY MEDICINE

## 2020-03-16 VITALS
RESPIRATION RATE: 20 BRPM | SYSTOLIC BLOOD PRESSURE: 105 MMHG | DIASTOLIC BLOOD PRESSURE: 78 MMHG | WEIGHT: 233 LBS | HEART RATE: 79 BPM | TEMPERATURE: 98.9 F | HEIGHT: 66 IN | BODY MASS INDEX: 37.45 KG/M2 | OXYGEN SATURATION: 97 %

## 2020-03-16 DIAGNOSIS — R11.0 NAUSEA: Primary | ICD-10-CM

## 2020-03-16 LAB
ALBUMIN SERPL-MCNC: 3.9 G/DL (ref 3.5–5.2)
ALBUMIN/GLOB SERPL: 1.3 G/DL
ALP SERPL-CCNC: 83 U/L (ref 39–117)
ALT SERPL W P-5'-P-CCNC: 14 U/L (ref 1–41)
ANION GAP SERPL CALCULATED.3IONS-SCNC: 12 MMOL/L (ref 5–15)
AST SERPL-CCNC: 18 U/L (ref 1–40)
BASOPHILS # BLD AUTO: 0.07 10*3/MM3 (ref 0–0.2)
BASOPHILS NFR BLD AUTO: 0.5 % (ref 0–1.5)
BILIRUB SERPL-MCNC: 1.1 MG/DL (ref 0.2–1.2)
BUN BLD-MCNC: 14 MG/DL (ref 6–20)
BUN/CREAT SERPL: 18.9 (ref 7–25)
CALCIUM SPEC-SCNC: 9 MG/DL (ref 8.6–10.5)
CHLORIDE SERPL-SCNC: 98 MMOL/L (ref 98–107)
CO2 SERPL-SCNC: 27 MMOL/L (ref 22–29)
CREAT BLD-MCNC: 0.74 MG/DL (ref 0.76–1.27)
DEPRECATED RDW RBC AUTO: 45.5 FL (ref 37–54)
EOSINOPHIL # BLD AUTO: 0.49 10*3/MM3 (ref 0–0.4)
EOSINOPHIL NFR BLD AUTO: 3.8 % (ref 0.3–6.2)
ERYTHROCYTE [DISTWIDTH] IN BLOOD BY AUTOMATED COUNT: 13.5 % (ref 12.3–15.4)
GFR SERPL CREATININE-BSD FRML MDRD: 109 ML/MIN/1.73
GLOBULIN UR ELPH-MCNC: 2.9 GM/DL
GLUCOSE BLD-MCNC: 83 MG/DL (ref 65–99)
HCT VFR BLD AUTO: 44.4 % (ref 37.5–51)
HGB BLD-MCNC: 15 G/DL (ref 13–17.7)
IMM GRANULOCYTES # BLD AUTO: 0.06 10*3/MM3 (ref 0–0.05)
IMM GRANULOCYTES NFR BLD AUTO: 0.5 % (ref 0–0.5)
LYMPHOCYTES # BLD AUTO: 2.2 10*3/MM3 (ref 0.7–3.1)
LYMPHOCYTES NFR BLD AUTO: 17.1 % (ref 19.6–45.3)
MCH RBC QN AUTO: 30.7 PG (ref 26.6–33)
MCHC RBC AUTO-ENTMCNC: 33.8 G/DL (ref 31.5–35.7)
MCV RBC AUTO: 90.8 FL (ref 79–97)
MONOCYTES # BLD AUTO: 0.99 10*3/MM3 (ref 0.1–0.9)
MONOCYTES NFR BLD AUTO: 7.7 % (ref 5–12)
NEUTROPHILS # BLD AUTO: 9.07 10*3/MM3 (ref 1.7–7)
NEUTROPHILS NFR BLD AUTO: 70.4 % (ref 42.7–76)
NRBC BLD AUTO-RTO: 0 /100 WBC (ref 0–0.2)
PLATELET # BLD AUTO: 221 10*3/MM3 (ref 140–450)
PMV BLD AUTO: 10 FL (ref 6–12)
POTASSIUM BLD-SCNC: 3.6 MMOL/L (ref 3.5–5.2)
PROT SERPL-MCNC: 6.8 G/DL (ref 6–8.5)
RBC # BLD AUTO: 4.89 10*6/MM3 (ref 4.14–5.8)
SODIUM BLD-SCNC: 137 MMOL/L (ref 136–145)
WBC NRBC COR # BLD: 12.88 10*3/MM3 (ref 3.4–10.8)

## 2020-03-16 PROCEDURE — 80053 COMPREHEN METABOLIC PANEL: CPT | Performed by: PHYSICIAN ASSISTANT

## 2020-03-16 PROCEDURE — 25010000002 ONDANSETRON PER 1 MG: Performed by: PHYSICIAN ASSISTANT

## 2020-03-16 PROCEDURE — 99283 EMERGENCY DEPT VISIT LOW MDM: CPT

## 2020-03-16 PROCEDURE — 85025 COMPLETE CBC W/AUTO DIFF WBC: CPT | Performed by: PHYSICIAN ASSISTANT

## 2020-03-16 PROCEDURE — 96374 THER/PROPH/DIAG INJ IV PUSH: CPT

## 2020-03-16 RX ORDER — ONDANSETRON 2 MG/ML
4 INJECTION INTRAMUSCULAR; INTRAVENOUS ONCE
Status: COMPLETED | OUTPATIENT
Start: 2020-03-16 | End: 2020-03-16

## 2020-03-16 RX ORDER — GABAPENTIN 100 MG/1
100 CAPSULE ORAL 3 TIMES DAILY
COMMUNITY

## 2020-03-16 RX ORDER — LISINOPRIL 2.5 MG/1
2.5 TABLET ORAL DAILY
COMMUNITY

## 2020-03-16 RX ORDER — SODIUM CHLORIDE 0.9 % (FLUSH) 0.9 %
10 SYRINGE (ML) INJECTION AS NEEDED
Status: DISCONTINUED | OUTPATIENT
Start: 2020-03-16 | End: 2020-03-17 | Stop reason: HOSPADM

## 2020-03-16 RX ORDER — ONDANSETRON 4 MG/1
4 TABLET, ORALLY DISINTEGRATING ORAL EVERY 6 HOURS PRN
Qty: 9 TABLET | Refills: 0 | Status: SHIPPED | OUTPATIENT
Start: 2020-03-16 | End: 2020-03-19

## 2020-03-16 RX ADMIN — SODIUM CHLORIDE 1000 ML: 9 INJECTION, SOLUTION INTRAVENOUS at 20:40

## 2020-03-16 RX ADMIN — ONDANSETRON 4 MG: 2 INJECTION INTRAMUSCULAR; INTRAVENOUS at 20:39

## 2020-03-16 NOTE — ED PROVIDER NOTES
Subjective   Mr. Stefano Claudio is a 59 y.o. male who presents to the ED with c/o nausea and diaphoresis that began yesterday. The patient reports that he did not seek medical attention yesterday, as he though he could overcome his symptoms. Today, he notes that he has been experiencing dizziness, light-headedness, and fatigue. The patient reports room-spinning dizziness after standing up, that dissipates after lying down. He notes that he has attempted to consume food and drink fluids, but added that he becomes nauseous after eating. He states that he has not vomited since onset. He also notes shortness of breath. The patient reports that he sought medical attention at  earlier today and was tested for influenza and COVID-19. He states that he was discharged and advised to self-quarantine. There are no other acute complaints at this time.      History provided by:  Patient   used: No    Nausea   The primary symptoms include fatigue and nausea. Primary symptoms do not include vomiting. The illness began yesterday. The onset was sudden. The problem has been gradually improving.       Review of Systems   Constitutional: Positive for diaphoresis and fatigue.   Gastrointestinal: Positive for nausea. Negative for vomiting.   Neurological: Positive for dizziness and light-headedness.   All other systems reviewed and are negative.      Past Medical History:   Diagnosis Date   • CHF (congestive heart failure) (CMS/[facility])    • COPD (chronic obstructive pulmonary disease) (CMS/[facility])    • Diabetes mellitus, type II (CMS/[facility])    • HTN (hypertension)    • PAD (peripheral artery disease) (CMS/[facility])    • Sleep apnea        Allergies   Allergen Reactions   • Penicillins Hives   • Adhesive Tape Rash       Past Surgical History:   Procedure Laterality Date   • COLON SURGERY         Family History   Adopted: Yes       Social History     Socioeconomic History   • Marital status:      Spouse name: Not  on file   • Number of children: Not on file   • Years of education: Not on file   • Highest education level: Not on file   Tobacco Use   • Smoking status: Current Every Day Smoker     Packs/day: 1.50     Years: 43.00     Pack years: 64.50     Types: Cigarettes   • Smokeless tobacco: Never Used   Substance and Sexual Activity   • Alcohol use: Never     Frequency: Never   • Drug use: Never   • Sexual activity: Defer   Social History Narrative    Caffeine coffee 1 serving of coffee 1 two liter of soda daily         Objective   Physical Exam   Constitutional: He is oriented to person, place, and time. He appears well-developed and well-nourished.   HENT:   Head: Normocephalic and atraumatic.   Nose: Nose normal.   Eyes: Conjunctivae are normal. No scleral icterus.   Neck: Normal range of motion. Neck supple.   Cardiovascular: Normal rate, regular rhythm and normal heart sounds.   No murmur heard.  Pulmonary/Chest: Effort normal and breath sounds normal. No respiratory distress.   Abdominal: Soft. There is no tenderness.   Musculoskeletal: Normal range of motion.   Neurological: He is alert and oriented to person, place, and time.   Skin: Skin is warm and dry.   Psychiatric: His behavior is normal. His mood appears anxious.   Nursing note and vitals reviewed.      Procedures         ED Course  ED Course as of Mar 16 2154   Mon Mar 16, 2020   2127 Notified by nursing that patient is asymptomatic. Requesting Rx for Zofran and would like to eat.    [JG]   5334 Patient presents to ED after already being seen and evaluated by the [facility] with negative influenza and having been tested for COVID-19.  Patient states that he went home and was feeling fine and then began to have symptoms of nausea and presents again to ED.  No acute abnormalities on physical exam.  No acute/emergent abnormalities on labs.  Patient responded well to IV fluids and Zofran while in ED.  Able to tolerate PO no additional symptoms  throughout his stay in ED.  Patient is afebrile, nontoxic appearing, vital signs stable and able to maintain O2 sats of 96% on room air. Patient will be discharged home with symptomatic care and outpatient follow up to their primary care provider as recommended. Patient is agreeable to plan of care of outpatient follow up, provided clear return precautions and demonstrated understanding.    [JG]      ED Course User Index  [JG] Fitz Yepez, PA     Recent Results (from the past 24 hour(s))   CBC Auto Differential    Collection Time: 03/16/20  8:37 PM   Result Value Ref Range    WBC 12.88 (H) 3.40 - 10.80 10*3/mm3    RBC 4.89 4.14 - 5.80 10*6/mm3    Hemoglobin 15.0 13.0 - 17.7 g/dL    Hematocrit 44.4 37.5 - 51.0 %    MCV 90.8 79.0 - 97.0 fL    MCH 30.7 26.6 - 33.0 pg    MCHC 33.8 31.5 - 35.7 g/dL    RDW 13.5 12.3 - 15.4 %    RDW-SD 45.5 37.0 - 54.0 fl    MPV 10.0 6.0 - 12.0 fL    Platelets 221 140 - 450 10*3/mm3    Neutrophil % 70.4 42.7 - 76.0 %    Lymphocyte % 17.1 (L) 19.6 - 45.3 %    Monocyte % 7.7 5.0 - 12.0 %    Eosinophil % 3.8 0.3 - 6.2 %    Basophil % 0.5 0.0 - 1.5 %    Immature Grans % 0.5 0.0 - 0.5 %    Neutrophils, Absolute 9.07 (H) 1.70 - 7.00 10*3/mm3    Lymphocytes, Absolute 2.20 0.70 - 3.10 10*3/mm3    Monocytes, Absolute 0.99 (H) 0.10 - 0.90 10*3/mm3    Eosinophils, Absolute 0.49 (H) 0.00 - 0.40 10*3/mm3    Basophils, Absolute 0.07 0.00 - 0.20 10*3/mm3    Immature Grans, Absolute 0.06 (H) 0.00 - 0.05 10*3/mm3    nRBC 0.0 0.0 - 0.2 /100 WBC   Comprehensive Metabolic Panel    Collection Time: 03/16/20  9:07 PM   Result Value Ref Range    Glucose 83 65 - 99 mg/dL    BUN 14 6 - 20 mg/dL    Creatinine 0.74 (L) 0.76 - 1.27 mg/dL    Sodium 137 136 - 145 mmol/L    Potassium 3.6 3.5 - 5.2 mmol/L    Chloride 98 98 - 107 mmol/L    CO2 27.0 22.0 - 29.0 mmol/L    Calcium 9.0 8.6 - 10.5 mg/dL    Total Protein 6.8 6.0 - 8.5 g/dL    Albumin 3.90 3.50 - 5.20 g/dL    ALT (SGPT) 14 1 - 41 U/L    AST (SGOT) 18 1 - 40  "U/L    Alkaline Phosphatase 83 39 - 117 U/L    Total Bilirubin 1.1 0.2 - 1.2 mg/dL    eGFR Non African Amer 109 >60 mL/min/1.73    Globulin 2.9 gm/dL    A/G Ratio 1.3 g/dL    BUN/Creatinine Ratio 18.9 7.0 - 25.0    Anion Gap 12.0 5.0 - 15.0 mmol/L     Note: In addition to lab results from this visit, the labs listed above may include labs taken at another facility or during a different encounter within the last 24 hours. Please correlate lab times with ED admission and discharge times for further clarification of the services performed during this visit.    No orders to display     Vitals:    03/16/20 1916 03/16/20 1917 03/16/20 2045   BP: 105/78     Pulse: 79     Resp: 20     Temp:  98.9 °F (37.2 °C)    TempSrc:  Oral    SpO2: 95%  96%   Weight: 106 kg (233 lb)     Height: 167.6 cm (66\")       Medications   sodium chloride 0.9 % flush 10 mL (has no administration in time range)   sodium chloride 0.9 % bolus 1,000 mL (1,000 mL Intravenous New Bag 3/16/20 2040)   ondansetron (ZOFRAN) injection 4 mg (4 mg Intravenous Given 3/16/20 2039)     ECG/EMG Results (last 24 hours)     ** No results found for the last 24 hours. **        No orders to display                                              MDM  Number of Diagnoses or Management Options  Nausea: new and requires workup     Amount and/or Complexity of Data Reviewed  Clinical lab tests: reviewed    Risk of Complications, Morbidity, and/or Mortality  Presenting problems: low  Diagnostic procedures: low  Management options: low    Patient Progress  Patient progress: stable      Final diagnoses:   Nausea       Documentation assistance provided by lg Castro.  Information recorded by the lg was done at my direction and has been verified and validated by me.     Senait Castro  03/16/20 2045       Fitz Yepez PA  03/16/20 2154    "

## 2020-03-17 NOTE — DISCHARGE INSTRUCTIONS
Symptomatic care is recommended.  Take all medications as prescribed and instructed.  Follow-up with your primary care or return to ED with worsening of symptoms.

## 2021-04-10 ENCOUNTER — APPOINTMENT (OUTPATIENT)
Dept: GENERAL RADIOLOGY | Facility: HOSPITAL | Age: 61
End: 2021-04-10

## 2021-04-10 ENCOUNTER — HOSPITAL ENCOUNTER (EMERGENCY)
Facility: HOSPITAL | Age: 61
Discharge: HOME | End: 2021-04-10
Attending: EMERGENCY MEDICINE | Admitting: EMERGENCY MEDICINE

## 2021-04-10 VITALS
HEIGHT: 69 IN | BODY MASS INDEX: 34.51 KG/M2 | SYSTOLIC BLOOD PRESSURE: 134 MMHG | DIASTOLIC BLOOD PRESSURE: 98 MMHG | OXYGEN SATURATION: 98 % | RESPIRATION RATE: 18 BRPM | TEMPERATURE: 98.4 F | HEART RATE: 56 BPM | WEIGHT: 233 LBS

## 2021-04-10 DIAGNOSIS — M70.72 BURSITIS OF BOTH HIPS, UNSPECIFIED BURSA: Primary | ICD-10-CM

## 2021-04-10 DIAGNOSIS — M70.71 BURSITIS OF BOTH HIPS, UNSPECIFIED BURSA: Primary | ICD-10-CM

## 2021-04-10 DIAGNOSIS — Z86.39 HISTORY OF DIABETES MELLITUS, TYPE II: ICD-10-CM

## 2021-04-10 PROCEDURE — 25010000002 TRIAMCINOLONE PER 10 MG: Performed by: EMERGENCY MEDICINE

## 2021-04-10 PROCEDURE — 96372 THER/PROPH/DIAG INJ SC/IM: CPT

## 2021-04-10 PROCEDURE — 99283 EMERGENCY DEPT VISIT LOW MDM: CPT

## 2021-04-10 PROCEDURE — 73523 X-RAY EXAM HIPS BI 5/> VIEWS: CPT

## 2021-04-10 RX ORDER — TRIAMCINOLONE ACETONIDE 40 MG/ML
80 INJECTION, SUSPENSION INTRA-ARTICULAR; INTRAMUSCULAR ONCE
Status: COMPLETED | OUTPATIENT
Start: 2021-04-10 | End: 2021-04-10

## 2021-04-10 RX ORDER — HYDROCODONE BITARTRATE AND ACETAMINOPHEN 7.5; 325 MG/1; MG/1
1 TABLET ORAL ONCE
Status: COMPLETED | OUTPATIENT
Start: 2021-04-10 | End: 2021-04-10

## 2021-04-10 RX ORDER — TRAMADOL HYDROCHLORIDE 50 MG/1
50 TABLET ORAL EVERY 6 HOURS PRN
Qty: 12 TABLET | Refills: 0 | Status: SHIPPED | OUTPATIENT
Start: 2021-04-10

## 2021-04-10 RX ADMIN — TRIAMCINOLONE ACETONIDE 80 MG: 40 INJECTION, SUSPENSION INTRA-ARTICULAR; INTRAMUSCULAR at 17:31

## 2021-04-10 RX ADMIN — HYDROCODONE BITARTRATE AND ACETAMINOPHEN 1 TABLET: 7.5; 325 TABLET ORAL at 17:26

## 2021-04-10 NOTE — DISCHARGE INSTRUCTIONS
Home to rest.  Medications as directed.  Strict care to avoid concentrated sugars and carbohydrates while taking steroids.  Keep a written log of your blood glucose readings and show this to Iam Christopher in close follow up.  Continue your current medication regimen.  Return to the emergency department as needed for worsening symptoms or concerns.  Thank you

## 2021-04-11 NOTE — ED PROVIDER NOTES
"Subjective   Patient presents to the emergency department with complaint of bilateral hip pain.  \"Every time I turned to the left to the right I get ready to fall\".  Patient states this came upon him suddenly yester  Patient has no history of falls.  He has no neurosensory complaints or focal weakness.  He denies any back pain.  He states that he can walk straight forward without limitations but turning or rotating his torso or turning aside in ambulation elicits such discomfort in his hips that he is at risk for fall.  No prior history of avascular necrosis or hip joint disease to his knowledge.  He denies any fever or recent illness.      History provided by:  Patient   used: No    Hip Pain  Location:  Bilateral  Quality:  Aching  Severity:  Moderate  Onset quality:  Sudden  Duration:  3 days  Timing:  Constant  Progression:  Unchanged  Chronicity:  New  Associated symptoms: myalgias    Associated symptoms: no abdominal pain, no chest pain, no congestion, no diarrhea, no fever, no nausea, no rash, no shortness of breath and no vomiting        Review of Systems   Constitutional: Negative for fever.   HENT: Negative for congestion.    Respiratory: Negative for shortness of breath.    Cardiovascular: Negative for chest pain.   Gastrointestinal: Negative for abdominal pain, diarrhea, nausea and vomiting.   Musculoskeletal: Positive for myalgias.   Skin: Negative for rash.   All other systems reviewed and are negative.      Past Medical History:   Diagnosis Date   • CHF (congestive heart failure) (CMS/[facility]CC)    • COPD (chronic obstructive pulmonary disease) (CMS/[facility]CC)    • Diabetes mellitus, type II (CMS/[facility]CC)    • HTN (hypertension)    • PAD (peripheral artery disease) (CMS/HCC)    • Sleep apnea        Allergies   Allergen Reactions   • Penicillins Hives   • Adhesive Tape Rash       Past Surgical History:   Procedure Laterality Date   • COLON SURGERY         Family History   Adopted: Yes       Social " History     Socioeconomic History   • Marital status:      Spouse name: Not on file   • Number of children: Not on file   • Years of education: Not on file   • Highest education level: Not on file   Tobacco Use   • Smoking status: Current Every Day Smoker     Packs/day: 1.50     Years: 43.00     Pack years: 64.50     Types: Cigarettes   • Smokeless tobacco: Never Used   Substance and Sexual Activity   • Alcohol use: Never   • Drug use: Never   • Sexual activity: Defer           Objective   Physical Exam  Vitals and nursing note reviewed.   Constitutional:       General: He is not in acute distress.     Appearance: Normal appearance. He is obese. He is not ill-appearing or toxic-appearing.      Comments: Patient is a fair historian.  His vital signs are normal.  He is able to go to a standing position without limitations.  He demonstrates ambulation for me yet feels discomfort with rotation that required in his hips to turn to the left or the right.   HENT:      Head: Normocephalic.      Right Ear: External ear normal.      Left Ear: External ear normal.      Nose: Nose normal.      Mouth/Throat:      Mouth: Mucous membranes are moist.      Pharynx: No oropharyngeal exudate.   Eyes:      Conjunctiva/sclera: Conjunctivae normal.   Cardiovascular:      Rate and Rhythm: Normal rate and regular rhythm.      Heart sounds: No murmur heard.     Pulmonary:      Effort: Pulmonary effort is normal. No respiratory distress.      Breath sounds: Normal breath sounds.   Abdominal:      General: Bowel sounds are normal. There is no distension.      Palpations: Abdomen is soft.      Tenderness: There is no abdominal tenderness.   Musculoskeletal:         General: No swelling, tenderness or deformity. Normal range of motion.      Cervical back: Normal range of motion and neck supple. No muscular tenderness.      Comments: Patient's hips are without erythema warmth crepitus or soft tissue swelling appreciated.  There is no  rash.  Lying in a supine position, patient has full range of motion actively and passively without limitations.  Distal joints are negative.  Neurovascular exam is negative.  Patient has some reproduction of his presenting pain with palpation of the trochanteric musculature.   Skin:     General: Skin is warm and dry.      Capillary Refill: Capillary refill takes less than 2 seconds.      Coloration: Skin is not pale.      Findings: No lesion.   Neurological:      General: No focal deficit present.      Mental Status: He is alert and oriented to person, place, and time.      Comments: No Neurosensory deficit or focal weakness     Psychiatric:         Behavior: Behavior normal.         Thought Content: Thought content normal.         Procedures           ED Course  ED Course as of Apr 10 2207   Sat Apr 10, 2021   1652 Patient's imaging is negative for acute findings.  He has demonstrated ambulation effectively with me.  His vitals have been stable throughout his ED course.  Discussed the benefits of short-course steroids for bursitis with need for strict glucose control understood.  Mr. Claudio agrees with outpatient plan of care with close follow up with Iam Christopher.      [MS]      ED Course User Index  [MS] Janae Wall, APRN      No results found for this or any previous visit (from the past 24 hour(s)).  Note: In addition to lab results from this visit, the labs listed above may include labs taken at another facility or during a different encounter within the last 24 hours. Please correlate lab times with ED admission and discharge times for further clarification of the services performed during this visit.    XR Hips Bilateral With or Without Pelvis 5 View   Final Result   Advanced bilateral hip arthrosis changes are present with   narrowing and sclerosis. There is otherwise no evidence of acute   fracture or dislocation.       This report was finalized on 4/10/2021 4:46 PM by Stefano Monte.      "       Vitals:    04/10/21 1131 04/10/21 1610   BP: 113/55 134/98   BP Location: Right arm    Patient Position: Sitting    Pulse: 68 56   Resp: 18    Temp: 98.4 °F (36.9 °C)    TempSrc: Oral    SpO2: 94% 98%   Weight: 106 kg (233 lb)    Height: 175.3 cm (69\")      Medications   triamcinolone acetonide (KENALOG-40) injection 80 mg (80 mg Intramuscular Given 4/10/21 1731)   HYDROcodone-acetaminophen (NORCO) 7.5-325 MG per tablet 1 tablet (1 tablet Oral Given 4/10/21 1726)     ECG/EMG Results (last 24 hours)     ** No results found for the last 24 hours. **        No orders to display                                            MDM    Final diagnoses:   Bursitis of both hips, unspecified bursa   History of diabetes mellitus, type II       ED Disposition  ED Disposition     ED Disposition Condition Comment    Discharge Stable           Iam Christopher, APRN  [address] [address]  [phone]    Schedule an appointment as soon as possible for a visit in 2 days  If symptoms worsen         Medication List      New Prescriptions    traMADol 50 MG tablet  Commonly known as: ULTRAM  Take 1 tablet by mouth Every 6 (Six) Hours As Needed for Moderate Pain .           Where to Get Your Medications      These medications were sent to [facility] [place], [place] - [address] [place] [address] - [phone]  - [phone]   [address] [address]    Phone: [phone]   · traMADol 50 MG tablet          Janae Wall, ISAI  04/11/21 1859    "

## 2023-09-26 NOTE — TELEPHONE ENCOUNTER
[facility] will not cover Spirivia Respimat  Per Dr. Whyte can try incruse will send new order   tablet TAKE 1 TABLET DAILY 90 tablet 1    metoprolol succinate (TOPROL XL) 25 MG extended release tablet Take 1 tablet by mouth nightly 90 tablet 1    finasteride (PROSCAR) 5 MG tablet TAKE 1 TABLET DAILY 90 tablet 1    terazosin (HYTRIN) 5 MG capsule TAKE 1 CAPSULE ONCE DAILY 90 capsule 1    tamsulosin (FLOMAX) 0.4 MG capsule Take 1 capsule by mouth nightly 90 capsule 0    amLODIPine (NORVASC) 5 MG tablet Take 1 tablet by mouth nightly 90 tablet 3    simvastatin (ZOCOR) 20 MG tablet TAKE 1 TABLET NIGHTLY 90 tablet 1    buPROPion (WELLBUTRIN XL) 150 MG extended release tablet Take 1 tablet by mouth every morning 90 tablet 1    FLUoxetine (PROZAC) 40 MG capsule TAKE 2 CAPSULES BY MOUTH ONCE DAILY 180 capsule 3    sulindac (CLINORIL) 150 MG tablet TAKE 1 TABLET BY MOUTH 2 TIMES DAILY. 60 tablet 11    potassium chloride (KLOR-CON 10) 10 MEQ extended release tablet Take 1 tablet by mouth daily 90 tablet 3    sildenafil (VIAGRA) 25 MG tablet Take 3-4 tablets by mouth as needed for Erectile Dysfunction      Cholecalciferol (VITAMIN D HIGH POTENCY PO) Take 500 mcg by mouth daily      vitamin B-12 (CYANOCOBALAMIN) 500 MCG tablet Take 1 tablet by mouth daily      polycarbophil (FIBERCON) 625 MG tablet Take 1 tablet by mouth daily      Tofacitinib Citrate ER 11 MG TB24 Take 11 mg by mouth daily      aspirin 81 MG chewable tablet Take 1 tablet by mouth daily      omeprazole (PRILOSEC) 20 MG capsule Take 1 capsule by mouth Daily         Allergies   Allergen Reactions    Codeine      Not sure of reaction    Pcn [Penicillins]      Not sure of reaction       Social History     Tobacco Use    Smoking status: Former     Packs/day: 2.00     Years: 15.00     Additional pack years: 0.00     Total pack years: 30.00     Types: Cigarettes     Quit date: 1993     Years since quittin.1    Smokeless tobacco: Current     Types: Snuff    Tobacco comments:     chews daily   Substance Use Topics    Alcohol use:  Yes     Alcohol/week: